# Patient Record
Sex: FEMALE | Race: WHITE
[De-identification: names, ages, dates, MRNs, and addresses within clinical notes are randomized per-mention and may not be internally consistent; named-entity substitution may affect disease eponyms.]

---

## 2020-07-27 ENCOUNTER — HOSPITAL ENCOUNTER (EMERGENCY)
Dept: HOSPITAL 46 - ED | Age: 75
LOS: 1 days | Discharge: HOME | End: 2020-07-28
Payer: COMMERCIAL

## 2020-07-27 VITALS — HEIGHT: 60 IN | BODY MASS INDEX: 35.34 KG/M2 | WEIGHT: 180.01 LBS

## 2020-07-27 DIAGNOSIS — G50.0: Primary | ICD-10-CM

## 2020-07-27 DIAGNOSIS — Z79.899: ICD-10-CM
